# Patient Record
(demographics unavailable — no encounter records)

---

## 2025-05-12 NOTE — REASON FOR VISIT
[Initial Evaluation] : an initial evaluation [FreeTextEntry1] : GERD, colon cancer screening, hx of colon polyp

## 2025-05-12 NOTE — PHYSICAL EXAM
[Alert] : alert [Normal Voice/Communication] : normal voice/communication [Healthy Appearing] : healthy appearing [No Acute Distress] : no acute distress [Sclera] : the sclera and conjunctiva were normal [Hearing Threshold Finger Rub Not [place]] : hearing was normal [Normal Lips/Gums] : the lips and gums were normal [Oropharynx] : the oropharynx was normal [Normal Appearance] : the appearance of the neck was normal [No Neck Mass] : no neck mass was observed [No Respiratory Distress] : no respiratory distress [No Acc Muscle Use] : no accessory muscle use [Respiration, Rhythm And Depth] : normal respiratory rhythm and effort [Auscultation Breath Sounds / Voice Sounds] : lungs were clear to auscultation bilaterally [Heart Rate And Rhythm] : heart rate was normal and rhythm regular [Normal S1, S2] : normal S1 and S2 [Murmurs] : no murmurs [Bowel Sounds] : normal bowel sounds [Abdomen Tenderness] : non-tender [No Masses] : no abdominal mass palpated [Abdomen Soft] : soft [] : no hepatosplenomegaly [Oriented To Time, Place, And Person] : oriented to person, place, and time

## 2025-05-12 NOTE — ASSESSMENT
[FreeTextEntry1] : This is a 51-year-old female with history of peptic ulcer disease and hiatal hernia seen on pretransplant endoscopy in August 2022.  However repeat upper endoscopy June 2024 without peptic ulceration, gastritis negative for H. pylori.  She denies GERD, dysphagia or odynophagia.  I recommend stopping the pantoprazole and switching over to famotidine 40 mg at bedtime.  She needs screening colonoscopy.  She had an inadequate colonic preparation from colonoscopy June 2024 and had colon polyps on pretransplant colonoscopy August 2022.  I explained to her risk benefits to a colonoscopy.  Risk including but not limited to bleeding, perforation, infection and adverse medication reaction.  She will need presurgical testing prior to the planned procedure with concerns for hyponatremia and liver transplant status.  She agrees with this.

## 2025-05-12 NOTE — HISTORY OF PRESENT ILLNESS
[FreeTextEntry1] : This is a 51-year-old female status post liver transplant, euvolemic hyponatremia, hypertension, history of peptic ulcer disease with endoscopy pretransplant in August 2022 with nonbleeding gastric erosions, repeat endoscopy posttransplant June 2024 with chronic inactive gastritis negative for H. pylori and no evidence of peptic ulceration.  She is currently on pantoprazole 40 mg daily.  She states that she was having GERD in the past but no longer.  She denies dysphagia or odynophagia.  Pretransplant colonoscopy August 2022 with four 2-8 mm benign-appearing polyps which were not removed.  Repeat colonoscopy posttransplant in June 2024 with inadequate bowel preparation, diverticulosis, hepatic flexure polyp removed with pathology consistent with hyperplastic changes.  She is now seeing GI to have repeat colonoscopy for colon cancer screening due to inadequate colonic preparation on June 2024 colonoscopy, and most probable residual colon polyps.  She denies family history of colon cancer or colon polyps.  Again she denies GERD, dysphagia or odynophagia.  She denies abdominal pain, nausea or vomiting.  She denies changes in bowel habits.  On average she has bowel movements every other day but does not feel constipated.  She denies rectal bleeding or melena.  She denies weight loss or anemia.

## 2025-07-23 NOTE — PHYSICAL EXAM
[Well Nourished] : well nourished [Healthy Appearing] : healthy appearing [No Acute Distress] : no acute distress [Normal Voice/Communication] : normal voice communication [EOMI] : extra ocular movement intact [Normal Hearing] : normal hearing [Normal Oropharynx] : normal oropharynx [Normal Appearance] : normal appearance [No Neck Mass] : no neck mass [Thyroid Not Enlarged] : thyroid not enlarged [Supple] : the neck was supple [No JVD] : no jugular venous distention [No Thyroid Nodules] : no thyroid nodules [No Respiratory Distress] : no respiratory distress [No Accessory Muscle Use] : no accessory muscle use [Normal Rhythm and Effort] : normal rhythm and effort [Clear to Auscultation] : lungs were clear to auscultation bilaterally [Normal S1, S2] : normal S1 and S2 [No Murmurs] : no murmurs heard [Normal Rate/Rhythm] : normal rate/rhythm [No Edema] : no edema [Non-Tender] : Liver Edge: non-tender [Smooth] : Liver Edge: smooth [Normal Bowel Sounds] : normal bowel sounds [Non Tender] : non-tender [No Masses] : no abdominal mass palpated [Soft] : soft [Previous Abdominal Surgery] : previous abdominal surgery [No CVA Tenderness] : no CVA tenderness [No Spinal Tenderness] : no spinal tenderness [Normal Gait] : normal gait [No Clubbing, Cyanosis] : no clubbing  or cyanosis of the fingernails [No Involuntary Movements] : no involuntary movements [No Rash] : no rash [Normal Turgor] : normal turgor [No Skin Lesions] : no skin lesions [No Focal Deficits] : no focal deficits [Normal Reflexes] : normal reflexes [No Motor Deficits] : no motor deficits [Normal Mood] : the mood was normal [Normal Affect] : normal affect [Remote Memory Intact] : remote memory intact [Normal Insight/Judgement] : normal insight/judgement [Scleral Icterus] : no scleral icterus [Splenomegaly] : no splenomegaly [Abdominal Ascites] : no abdominal ascites [Spider Angioma] : no spider angioma [Jaundice] : no jaundice [Palmar Erythema] : no palmar erythema [de-identified] : +healed chevron scar [de-identified] : +mild hirsutism

## 2025-07-23 NOTE — HISTORY OF PRESENT ILLNESS
[Alcoholic Liver Disease] : Alcoholic Liver Disease [Liver] : Liver [Donor after brain death (DBD] : Donor after brain death (DBD) [None] : None [Steroids] : Steroids [Negative/Negative] : Negative/Negative [Acute Cellular Rejection] : Acute Cellular Rejection [Not Working] : Not working [90: Able to carry normal activity; minor signs or symptoms of disease.] : 90: Able to carry normal activity; minor signs or symptoms of disease.  [PHS Increased Risk] : no Public Health Service increased risk [Hepatitis C (CECI+)] : no hepatitis c (CECI+) [ABO Incompatible] : ABO compatible [Hepatitis B Core Ab Positive] : not hepatitis B core Ab positive [[id number]] : - Early post-transplant intolerance of tacrolimus due to neurotoxicity with AMS and witnessed tonic-clonic seizure, prompting switch to cyclosporine. - ACR (no biopsy) - s/p pulse steroids (started 12/8/22). - Steroid-refractory ACR (s/p biopsy 12/12/22 with SILVA 5/9 consistent with moderate ACR) - s/p thymoglobulin (12/13/22-12/16/22). - Perihepatic collection - s/p percutaneous drain (12/12/22-12/21/22) with no evidence of bile leak. - Hyponatremia - required readmission (2/22/23-2/26/23) and ER visit (9/7/23), treated with fluid restriction, furosemide, and salt tabs.  [FreeTextEntry1] : Ms. 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 Her pre-transplant course was complicated by frailty and immobility that improved with rehabilitation at [facility]. Her post-transplant course was complicated by early post-transplant intolerance of tacrolimus due to neurotoxicity with AMS and a witnessed tonic-clonic seizure that prompted early switch to cyclosporine, as well as early ACR. She was empirically treated with pulse steroids (started 12/8/22), but subsequently underwent a biopsy (12/12/22) that showed steroid-refractory moderate ACR (SILVA 5/9), treated with thymoglobulin (12/13/22-12/16/22). She also had a perihepatic collection requiring IR percutaneous drainage (12/12/22-12/21/22) but with no evidence of a bile leak. There was concern for a possible hepatic artery anastomotic stricture on CT angiogram (12/11/22), but this did not require any surgical or IR intervention nor anticoagulation. She was discharged to inpatient acute rehabilitation at [facility] on 12/22/22 and then discharged home from there on 1/9/23. She was switched from Bactrim to Mepron for PJP prophylaxis due to hyperkalemia. She was readmitted to [facility] from 2/22/23-2/26/23 due to severe hyponatremia to Na 119 that partially improved with salt tabs and fluid restriction. She was also seen in the ER at [facility] on 9/7/23 due to acute on chronic hyponatremia to Na 122 that again improved with re-increased dosing of her salt tabs as well as discontinuation of sertraline and melatonin.  She was last seen in this office on 10/02/2024 and presents today for routine follow up. Her current immunosuppression is: cyclosporine 100 mg bid and  mg bis, both unchanged. Today, she reports feeling well with no new health concerns. She started a new job, currently in an administration role. She is scheduled for a colonoscopy in 9/2025 with Dr. Okeefe.

## 2025-07-23 NOTE — ASSESSMENT
[FreeTextEntry1] : # Hepatic allograft, now >2.5 years (12/01/2022) post-DDLT for alcohol-associated cirrhosis without HCC: - She had an episode of early steroid-refractory ACR (12/2022) requiring thymoglobulin but has had stable hepatic allograft function since then including on her most recent labs wth mild GGT elevation only on most recent labs (7/22/2025). - Early inpatient CT angiogram (12/11/22) was concerning for possible hepatic artery anastomotic stricture, but discussed with transplant surgeon Dr. Dagher previously and monitoring without need for re-imaging with Doppler US unless liver chemistries worsen. Continue aspirin 81 mg po daily for HAT prophylaxis. - Continue modified cyclosporine 100 mg po q12h for goal trough level 100 ng/mL. - Continue  mg po bid for renal sparing regimen. - Re-check labs every 3 months, ordered next for 10/2025.  # Prophylaxis: - PJP: Off Bactrim due to hyperkalemia. Completed Mepron on 3/22/23. - CMV (D-/R-): Low risk. S/p Valcyte prophylaxis (completed on 3/14/23). - Magnesium: Continue magnesium oxide 800 mg po bid.  # Euvolemic hyponatremia, likely secondary to SIADH: - She had an episode of acutely worsened hyponatremia with Na 122 that previously necessitated a brief ER visit (9/7/23), but subsequently improved. - Recently, she has waxed and waned, sometime with normal Na and sometimes with mild hyponatremia. She remains euvolemic and asymptomatic. - Continue salt tabs 2g po qam + 1g po midday + 1g po qhs (4 per day). - She remains off sertraline and melatonin since mid-9/2023 in case these were contributing. - Continue furosemide 20 mg po every other day, also for mild intermittent BLE edema. - She previously had work-up with nephrologist Dr. Conor Mesa with no other etiology identified. - We previously discussed limiting oral fluids to 32 oz/day.  # Hypertension: - BP at goal today of <130/80 mmHg. - Continue nifedipine ER 60 mg po bid. - Continue propranolol 20 mg po bid (also for prior CNI-related tremors, last increased on 5/17/23 for BP). - Continue furosemide 20 mg po every other day, as above.  # Hypercholesterolemia: - Last lipid panel (9/18/24) with LDL still borderline at 100 mg/dL despite pravastatin, but unable to increase pravastatin dose due to DDI with cyclosporine and LDL not yet sufficiently elevated to warrant switch to PCSK9 inhibitor. - Re-check fasting lipid panel in 6-12 months, ordered with next labs. - Continue pravastatin 10 mg po qhs.  # Right elbow olecranon bursitis (resolved): S/p aspiration (4/20/23) without evidence of infection.  # Chronic intermittent bilateral shoulder and elbow pain and morning stiffness: Previously referred to Rheumatology but no longer symptomatic.  # GERD, PUD, and hiatal hernia: - Pre-transplant EGD (8/25/22) with non-bleeding small gastric erosions. - Repeat EGD post-transplant (6/18/24) with patchy mild gastropathy (biopsied: chronic inactive gastritis with no H pylori or intestinal metaplasia). - Continue pantoprazole 40 mg po daily.  # Colon polyps: - Pre-transplant colonoscopy (8/25/22) with four 2-8 mm benign-appearing polyps not yet removed. - Repeat colonoscopy post-transplant (6/18/24) with inadequate bowel preparation, hemorrhoids, left colon diverticulosis, and removal of a 2 mm hepatic flexure polyp (pathology: mild hyperplastic changes) but with the other previously noted polyps not visualized, possibly due to the suboptimal bowel preparation. - Pending repeat screening in 9/2025.  # AUD, in remission, also with anxiety:  - No known alcohol slips or relapses post-transplant. Continuing periodic biomarker surveillance. She declined to enroll in any alcohol RPP post-transplant despite prior discussions and also declines AUD pharmacotherapy. - She reports that her anxiety has been well-controlled with PRN hydroxyzine. She will be referred back to psychiatrist Dr. Shipley as needed.  # Health maintenance in the liver transplant recipient: - Renal function: No significant proteinuria (6/2024), re-check with next labs. Continue renal sparing immunosuppression regimen as above. - Bone health: Continue OsCal D 1 tab po bid. She needs DEXA, previously re-ordered and discussed again today. - Glycemic control: Recent HbA1c within normal limits (3/11/2025), no longer with prediabetes. We monitor annually. Continue weight control, Mediterranean style diet and exercise. - Vaccinations: HAV immune. HBV non-immune despite prior Heplisav-B vaccinations on 8/31/22 and 10/9/22, with recent HbsAb still low (6/2024). She may benefit from re-vaccination with Heplisav-B series again now that she is post-transplant, deferred. She received Tdap on 9/5/22. She received PCV-20 on 8/31/22. She received this fall's influenza vaccine (9/2024) but needs this fall's COVID-19 vaccine. We previously discussed but she did not get Shingrix vaccinations yet (previously discussed with Transplant ID and recommended even though she never had chickenpox). - Cancer screening: She was advised to use sun protection measures daily (sunscreen, hat, and long sleeves) and to have a full skin evaluation annually by Dermatology (previously referred and reminded again today). She was advised to continue age-appropriate screening for other malignancies. - Other: Ms. RAMIREZ was counseled to: abstain from alcohol and all illicit drugs; avoid use of herbal and dietary supplements due to potential hepatotoxicity; limit use of acetaminophen to <2 grams per day; avoid eating any unpasteurized dairy products; avoid eating any raw or undercooked eggs, fish, poultry, or meat; and avoid eating raw/steamed oysters or other shellfish. She was also advised to wear footwear when on the beach / in the sea.  Next follow-up: 6 months